# Patient Record
Sex: FEMALE | Race: WHITE | NOT HISPANIC OR LATINO | URBAN - METROPOLITAN AREA
[De-identification: names, ages, dates, MRNs, and addresses within clinical notes are randomized per-mention and may not be internally consistent; named-entity substitution may affect disease eponyms.]

---

## 2018-11-28 ENCOUNTER — EMERGENCY (EMERGENCY)
Facility: HOSPITAL | Age: 53
LOS: 0 days | Discharge: HOME | End: 2018-11-28
Attending: EMERGENCY MEDICINE
Payer: COMMERCIAL

## 2018-11-28 VITALS
DIASTOLIC BLOOD PRESSURE: 67 MMHG | RESPIRATION RATE: 18 BRPM | SYSTOLIC BLOOD PRESSURE: 132 MMHG | OXYGEN SATURATION: 97 % | TEMPERATURE: 98 F | HEART RATE: 76 BPM

## 2018-11-28 VITALS
DIASTOLIC BLOOD PRESSURE: 105 MMHG | WEIGHT: 149.91 LBS | OXYGEN SATURATION: 99 % | SYSTOLIC BLOOD PRESSURE: 197 MMHG | RESPIRATION RATE: 16 BRPM | HEART RATE: 80 BPM | HEIGHT: 66 IN | TEMPERATURE: 98 F
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
## 2018-11-28 DIAGNOSIS — R22.0 LOCALIZED SWELLING, MASS AND LUMP, HEAD: ICD-10-CM
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
## 2018-11-28 DIAGNOSIS — Z04.1 ENCOUNTER FOR EXAMINATION AND OBSERVATION FOLLOWING TRANSPORT ACCIDENT: ICD-10-CM

## 2018-11-28 DIAGNOSIS — S09.90XA UNSPECIFIED INJURY OF HEAD, INITIAL ENCOUNTER: ICD-10-CM

## 2018-11-28 PROCEDURE — 99284 EMERGENCY DEPT VISIT MOD MDM: CPT | Mod: 25

## 2018-11-28 PROCEDURE — 70450 CT HEAD/BRAIN W/O DYE: CPT | Mod: 26

## 2018-11-28 PROCEDURE — 99053 MED SERV 10PM-8AM 24 HR FAC: CPT
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
## 2018-11-28 NOTE — ED ADULT NURSE NOTE - NSIMPLEMENTINTERV_GEN_ALL_ED
Implemented All Universal Safety Interventions:  [place] to call system. Call bell, personal items and telephone within reach. Instruct patient to call for assistance. Room bathroom lighting operational. Non-slip footwear when patient is off stretcher. Physically safe environment: no spills, clutter or unnecessary equipment. Stretcher in lowest position, wheels locked, appropriate side rails in place.

## 2020-02-11 NOTE — ED ADULT NURSE NOTE - GASTROINTESTINAL ASSESSMENT
INR at goal. Medications and chart reviewed. No changes noted to necessitate adjustment of warfarin or follow-up plan. See calendar.       WDL

## 2021-03-31 ENCOUNTER — EMERGENCY (EMERGENCY)
Facility: HOSPITAL | Age: 56
LOS: 1 days | Discharge: HOME | End: 2021-03-31
Admitting: EMERGENCY MEDICINE
Payer: MEDICAID

## 2021-03-31 ENCOUNTER — OUTPATIENT (OUTPATIENT)
Dept: OUTPATIENT SERVICES | Facility: HOSPITAL | Age: 56
LOS: 1 days | End: 2021-03-31
Payer: MEDICAID

## 2021-03-31 VITALS
DIASTOLIC BLOOD PRESSURE: 120 MMHG | RESPIRATION RATE: 16 BRPM | TEMPERATURE: 97 F | WEIGHT: 190.04 LBS | HEART RATE: 50 BPM | HEIGHT: 66 IN | OXYGEN SATURATION: 99 % | SYSTOLIC BLOOD PRESSURE: 220 MMHG

## 2021-03-31 VITALS
SYSTOLIC BLOOD PRESSURE: 181 MMHG | OXYGEN SATURATION: 97 % | RESPIRATION RATE: 18 BRPM | TEMPERATURE: 97 F | HEIGHT: 66 IN | HEART RATE: 52 BPM | DIASTOLIC BLOOD PRESSURE: 117 MMHG

## 2021-03-31 DIAGNOSIS — K05.6 PERIODONTAL DISEASE, UNSPECIFIED: ICD-10-CM

## 2021-03-31 DIAGNOSIS — K08.199 COMPLETE LOSS OF TEETH DUE TO OTHER SPECIFIED CAUSE, UNSPECIFIED CLASS: Chronic | ICD-10-CM

## 2021-03-31 DIAGNOSIS — K02.63 DENTAL CARIES ON SMOOTH SURFACE PENETRATING INTO PULP: ICD-10-CM

## 2021-03-31 DIAGNOSIS — Z98.890 OTHER SPECIFIED POSTPROCEDURAL STATES: Chronic | ICD-10-CM

## 2021-03-31 DIAGNOSIS — K02.9 DENTAL CARIES, UNSPECIFIED: ICD-10-CM

## 2021-03-31 LAB
ANION GAP SERPL CALC-SCNC: 10 MMOL/L — SIGNIFICANT CHANGE UP (ref 7–14)
BUN SERPL-MCNC: 22 MG/DL — SIGNIFICANT CHANGE UP (ref 7–23)
CALCIUM SERPL-MCNC: 10.2 MG/DL — SIGNIFICANT CHANGE UP (ref 8.4–10.5)
CHLORIDE SERPL-SCNC: 105 MMOL/L — SIGNIFICANT CHANGE UP (ref 98–107)
CO2 SERPL-SCNC: 26 MMOL/L — SIGNIFICANT CHANGE UP (ref 22–31)
CREAT SERPL-MCNC: 1 MG/DL — SIGNIFICANT CHANGE UP (ref 0.5–1.3)
GLUCOSE SERPL-MCNC: 87 MG/DL — SIGNIFICANT CHANGE UP (ref 70–99)
HCG UR QL: NEGATIVE — SIGNIFICANT CHANGE UP
HCT VFR BLD CALC: 45.4 % — HIGH (ref 34.5–45)
HGB BLD-MCNC: 15.3 G/DL — SIGNIFICANT CHANGE UP (ref 11.5–15.5)
MCHC RBC-ENTMCNC: 30.5 PG — SIGNIFICANT CHANGE UP (ref 27–34)
MCHC RBC-ENTMCNC: 33.7 GM/DL — SIGNIFICANT CHANGE UP (ref 32–36)
MCV RBC AUTO: 90.4 FL — SIGNIFICANT CHANGE UP (ref 80–100)
NRBC # BLD: 0 /100 WBCS — SIGNIFICANT CHANGE UP
NRBC # FLD: 0 K/UL — SIGNIFICANT CHANGE UP
PLATELET # BLD AUTO: 200 K/UL — SIGNIFICANT CHANGE UP (ref 150–400)
POTASSIUM SERPL-MCNC: 4.1 MMOL/L — SIGNIFICANT CHANGE UP (ref 3.5–5.3)
POTASSIUM SERPL-SCNC: 4.1 MMOL/L — SIGNIFICANT CHANGE UP (ref 3.5–5.3)
RBC # BLD: 5.02 M/UL — SIGNIFICANT CHANGE UP (ref 3.8–5.2)
RBC # FLD: 12.3 % — SIGNIFICANT CHANGE UP (ref 10.3–14.5)
SODIUM SERPL-SCNC: 141 MMOL/L — SIGNIFICANT CHANGE UP (ref 135–145)
WBC # BLD: 6.39 K/UL — SIGNIFICANT CHANGE UP (ref 3.8–10.5)
WBC # FLD AUTO: 6.39 K/UL — SIGNIFICANT CHANGE UP (ref 3.8–10.5)

## 2021-03-31 PROCEDURE — 93010 ELECTROCARDIOGRAM REPORT: CPT

## 2021-03-31 PROCEDURE — 99284 EMERGENCY DEPT VISIT MOD MDM: CPT

## 2021-03-31 RX ORDER — AMLODIPINE BESYLATE 2.5 MG/1
5 TABLET ORAL ONCE
Refills: 0 | Status: COMPLETED | OUTPATIENT
Start: 2021-03-31 | End: 2021-03-31

## 2021-03-31 RX ORDER — SODIUM CHLORIDE 9 MG/ML
1000 INJECTION, SOLUTION INTRAVENOUS
Refills: 0 | Status: DISCONTINUED | OUTPATIENT
Start: 2021-05-13 | End: 2021-05-27

## 2021-03-31 RX ORDER — AMLODIPINE BESYLATE 2.5 MG/1
1 TABLET ORAL
Qty: 14 | Refills: 0
Start: 2021-03-31 | End: 2021-04-13

## 2021-03-31 RX ADMIN — AMLODIPINE BESYLATE 5 MILLIGRAM(S): 2.5 TABLET ORAL at 21:02

## 2021-03-31 NOTE — ED PROVIDER NOTE - OBJECTIVE STATEMENT
56 y/o F h/o HTN sent to ED from pcp office 2/2 elevated blood pressure. Pt was seen for presurgical clearance, however noted to have elevated blood pressure 200s/100s. Pt is completely asymptomatic. Denies any chest pain, sob, headache, dizziness. States she feels normal. Pt states she has not taken her BP meds since December as she was unable to get medication refilled. Pt has follow up with PCP tomorrow.

## 2021-03-31 NOTE — ED PROVIDER NOTE - CLINICAL SUMMARY MEDICAL DECISION MAKING FREE TEXT BOX
Asymptomatic high blood pressure. Has not been compliant with BP meds. Will give dose of her BP meds now and sent rx to pharmacy. Pt has follow up with PCP tomorrow.

## 2021-03-31 NOTE — H&P PST ADULT - NSICDXPROBLEM_GEN_ALL_CORE_FT
PROBLEM DIAGNOSES  Problem: Dental caries  Assessment and Plan: Pt scheduled for surgery and preop instructions including instructions for taking Famotidine on the day of surgery, given verbally and with use of  written materials, and patient confirming understanding of such instructions using  teach back method.  Pt sent to ER for elevated BP - request MC - forms given

## 2021-03-31 NOTE — ED PROVIDER NOTE - PATIENT PORTAL LINK FT
You can access the FollowMyHealth Patient Portal offered by [facility] by registering at the following website: [email or web address]. By joining [facility]’s FollowMyHealth portal, you will also be able to view your health information using other applications (apps) compatible with our system.

## 2021-03-31 NOTE — ED ADULT TRIAGE NOTE - CHIEF COMPLAINT QUOTE
PT presents to ED via EMS with c/o HTN. Pt was having presurgical testing completed and was found to be hypertensive 200/100. Pt has hx of HTN and has been noncompliant with Amlodipine x 3 months. Pt denies headache, numbness, tingling, vision changes, or other neuro deficits.

## 2021-03-31 NOTE — H&P PST ADULT - NSANTHOSAYNRD_GEN_A_CORE
No. DUGLAS screening performed.  STOP BANG Legend: 0-2 = LOW Risk; 3-4 = INTERMEDIATE Risk; 5-8 = HIGH Risk

## 2021-03-31 NOTE — H&P PST ADULT - HISTORY OF PRESENT ILLNESS
Pt is a 55 yr old female scheduled for Dental Implants # 22, 27, Extractions Teeth , #1, 2, 3, 4, 5, 6, 7, 8, 9, 10, 11 12, 13 14, 15, 16, 17, 19, 20, 21, 22, 23, 24, 25, 26, 27, 28, 29, 30 - pt denies pain or infection and states she has multiple cracks and cavities and desires to have teeth removed. Pt surgeon is Dr Ron and date of surgery tentatively 4/8/21. Pt off HTN med at this time - to see PCP tomorrow for MC -  Pt is a 55 yr old female scheduled for Dental Implants # 22, 27, Extractions Teeth , #1, 2, 3, 4, 5, 6, 7, 8, 9, 10, 11 12, 13 14, 15, 16, 17, 19, 20, 21, 22, 23, 24, 25, 26, 27, 28, 29, 30 - pt denies pain or infection and states she has multiple cracks and cavities and desires to have teeth removed. Pt surgeon is Dr Ron and date of surgery tentatively 4/8/21. Pt off HTN med at this time - to see PCP tomorrow for MC - Pt BP in /120 and sent to ER by ambulance 6:25pm.   Pt denies COVID or recent travel   Pt to have COVID preop test  Pt is a 55 yr old female scheduled for Dental Implants # 22, 27, Extractions Teeth , #1, 2, 3, 4, 5, 6, 7, 8, 9, 10, 11 12, 13 14, 15, 16, 17, 19, 20, 21, 22, 23, 24, 25, 26, 27, 28, 29, 30 - pt denies pain or infection and states she has multiple cracks and cavities and desires to have teeth removed. Pt surgeon is Dr Ron and date of surgery tentatively 4/8/21. Pt off HTN med at this time - to see PCP tomorrow for MC - Pt BP in /120 and sent to ER by ambulance 6:25pm. -Pt denies HA, CP or change in vision   Pt denies COVID or recent travel   Pt to have COVID preop test

## 2021-03-31 NOTE — H&P PST ADULT - CARDIOVASCULAR COMMENTS
Hx of HTN - pt stopped med 3 months ago - has not had Rx renewed - was to see PCP in am - BP in /120 - pt denies CP or HA or vision changes

## 2021-03-31 NOTE — ED ADULT NURSE NOTE - OBJECTIVE STATEMENT
54yo female received in cdu 1. pt A&Ox3, ambulatory, hx of htn, was sent from her PCP for elevated blood pressure. pt states she haven't took any blood pressure medication since last year december. pt asymptomatic, denies headache or blurriness in vision. CRISSY Marte assessing patient at bedside. respiration even and non-labored. in nad. med administered as per order. MD kim in progress.

## 2021-04-02 DIAGNOSIS — Z01.818 ENCOUNTER FOR OTHER PREPROCEDURAL EXAMINATION: ICD-10-CM

## 2021-04-02 PROBLEM — Z00.00 ENCOUNTER FOR PREVENTIVE HEALTH EXAMINATION: Status: ACTIVE | Noted: 2021-04-02

## 2021-04-05 ENCOUNTER — APPOINTMENT (OUTPATIENT)
Dept: DISASTER EMERGENCY | Facility: CLINIC | Age: 56
End: 2021-04-05

## 2021-04-05 LAB — SARS-COV-2 N GENE NPH QL NAA+PROBE: NOT DETECTED

## 2021-04-27 PROBLEM — K02.9 DENTAL CARIES, UNSPECIFIED: Chronic | Status: ACTIVE | Noted: 2021-03-31

## 2021-04-27 PROBLEM — I10 ESSENTIAL (PRIMARY) HYPERTENSION: Chronic | Status: ACTIVE | Noted: 2021-03-31

## 2021-05-03 ENCOUNTER — APPOINTMENT (OUTPATIENT)
Dept: MRI IMAGING | Facility: HOSPITAL | Age: 56
End: 2021-05-03

## 2021-05-03 ENCOUNTER — OUTPATIENT (OUTPATIENT)
Dept: OUTPATIENT SERVICES | Facility: HOSPITAL | Age: 56
LOS: 1 days | End: 2021-05-03
Payer: COMMERCIAL

## 2021-05-03 DIAGNOSIS — Z98.890 OTHER SPECIFIED POSTPROCEDURAL STATES: Chronic | ICD-10-CM

## 2021-05-03 DIAGNOSIS — K08.199 COMPLETE LOSS OF TEETH DUE TO OTHER SPECIFIED CAUSE, UNSPECIFIED CLASS: Chronic | ICD-10-CM

## 2021-05-03 PROCEDURE — A9577: CPT

## 2021-05-03 PROCEDURE — 75561 CARDIAC MRI FOR MORPH W/DYE: CPT | Mod: 26

## 2021-05-03 PROCEDURE — 75561 CARDIAC MRI FOR MORPH W/DYE: CPT

## 2021-05-07 ENCOUNTER — NON-APPOINTMENT (OUTPATIENT)
Age: 56
End: 2021-05-07

## 2021-05-07 DIAGNOSIS — Z86.79 PERSONAL HISTORY OF OTHER DISEASES OF THE CIRCULATORY SYSTEM: ICD-10-CM

## 2021-05-07 DIAGNOSIS — F17.210 NICOTINE DEPENDENCE, CIGARETTES, UNCOMPLICATED: ICD-10-CM

## 2021-05-07 DIAGNOSIS — Z86.39 PERSONAL HISTORY OF OTHER ENDOCRINE, NUTRITIONAL AND METABOLIC DISEASE: ICD-10-CM

## 2021-05-07 RX ORDER — LOSARTAN POTASSIUM AND HYDROCHLOROTHIAZIDE 12.5; 1 MG/1; MG/1
100-12.5 TABLET ORAL DAILY
Refills: 0 | Status: ACTIVE | COMMUNITY

## 2021-05-07 RX ORDER — ATORVASTATIN CALCIUM 10 MG/1
10 TABLET, FILM COATED ORAL DAILY
Refills: 0 | Status: ACTIVE | COMMUNITY

## 2021-05-07 RX ORDER — VARENICLINE TARTRATE 1 MG/1
TABLET, FILM COATED ORAL
Refills: 0 | Status: ACTIVE | COMMUNITY

## 2021-05-10 ENCOUNTER — APPOINTMENT (OUTPATIENT)
Dept: CARDIOTHORACIC SURGERY | Facility: CLINIC | Age: 56
End: 2021-05-10
Payer: MEDICAID

## 2021-05-10 ENCOUNTER — OUTPATIENT (OUTPATIENT)
Dept: OUTPATIENT SERVICES | Facility: HOSPITAL | Age: 56
LOS: 1 days | End: 2021-05-10
Payer: COMMERCIAL

## 2021-05-10 VITALS
OXYGEN SATURATION: 98 % | BODY MASS INDEX: 30.53 KG/M2 | RESPIRATION RATE: 16 BRPM | DIASTOLIC BLOOD PRESSURE: 80 MMHG | SYSTOLIC BLOOD PRESSURE: 135 MMHG | HEIGHT: 66 IN | HEART RATE: 60 BPM | WEIGHT: 190 LBS

## 2021-05-10 DIAGNOSIS — Z82.49 FAMILY HISTORY OF ISCHEMIC HEART DISEASE AND OTHER DISEASES OF THE CIRCULATORY SYSTEM: ICD-10-CM

## 2021-05-10 DIAGNOSIS — Z83.3 FAMILY HISTORY OF DIABETES MELLITUS: ICD-10-CM

## 2021-05-10 DIAGNOSIS — Z98.890 OTHER SPECIFIED POSTPROCEDURAL STATES: Chronic | ICD-10-CM

## 2021-05-10 DIAGNOSIS — K08.199 COMPLETE LOSS OF TEETH DUE TO OTHER SPECIFIED CAUSE, UNSPECIFIED CLASS: Chronic | ICD-10-CM

## 2021-05-10 DIAGNOSIS — D15.1 BENIGN NEOPLASM OF HEART: ICD-10-CM

## 2021-05-10 DIAGNOSIS — Z78.9 OTHER SPECIFIED HEALTH STATUS: ICD-10-CM

## 2021-05-10 DIAGNOSIS — I51.89 OTHER ILL-DEFINED HEART DISEASES: ICD-10-CM

## 2021-05-10 PROCEDURE — 99204 OFFICE O/P NEW MOD 45 MIN: CPT

## 2021-05-10 PROCEDURE — 99072 ADDL SUPL MATRL&STAF TM PHE: CPT

## 2021-05-10 PROCEDURE — 71046 X-RAY EXAM CHEST 2 VIEWS: CPT

## 2021-05-10 PROCEDURE — 71046 X-RAY EXAM CHEST 2 VIEWS: CPT | Mod: 26

## 2021-05-11 ENCOUNTER — LABORATORY RESULT (OUTPATIENT)
Age: 56
End: 2021-05-11

## 2021-05-11 ENCOUNTER — APPOINTMENT (OUTPATIENT)
Dept: DISASTER EMERGENCY | Facility: CLINIC | Age: 56
End: 2021-05-11

## 2021-05-11 PROBLEM — D15.1 PAPILLARY FIBROELASTOMA OF HEART: Status: ACTIVE | Noted: 2021-05-11

## 2021-05-11 PROBLEM — Z78.9 DOES NOT USE ILLICIT DRUGS: Status: ACTIVE | Noted: 2021-05-11

## 2021-05-11 PROBLEM — I51.89 MASS OF LEFT CARDIAC VENTRICLE: Status: ACTIVE | Noted: 2021-05-07

## 2021-05-11 PROBLEM — Z82.49 FAMILY HISTORY OF HYPERTENSION: Status: ACTIVE | Noted: 2021-05-11

## 2021-05-11 PROBLEM — Z78.9 SOCIAL ALCOHOL USE: Status: ACTIVE | Noted: 2021-05-11

## 2021-05-11 PROBLEM — Z83.3 FAMILY HISTORY OF DIABETES MELLITUS: Status: ACTIVE | Noted: 2021-05-11

## 2021-05-12 ENCOUNTER — OUTPATIENT (OUTPATIENT)
Dept: OUTPATIENT SERVICES | Facility: HOSPITAL | Age: 56
LOS: 1 days | End: 2021-05-12

## 2021-05-12 VITALS
HEART RATE: 54 BPM | WEIGHT: 188.94 LBS | OXYGEN SATURATION: 99 % | TEMPERATURE: 97 F | RESPIRATION RATE: 16 BRPM | DIASTOLIC BLOOD PRESSURE: 80 MMHG | HEIGHT: 67.5 IN | SYSTOLIC BLOOD PRESSURE: 100 MMHG

## 2021-05-12 DIAGNOSIS — K02.9 DENTAL CARIES, UNSPECIFIED: ICD-10-CM

## 2021-05-12 DIAGNOSIS — K02.63 DENTAL CARIES ON SMOOTH SURFACE PENETRATING INTO PULP: ICD-10-CM

## 2021-05-12 DIAGNOSIS — I10 ESSENTIAL (PRIMARY) HYPERTENSION: ICD-10-CM

## 2021-05-12 DIAGNOSIS — D15.1 BENIGN NEOPLASM OF HEART: ICD-10-CM

## 2021-05-12 DIAGNOSIS — Z86.79 PERSONAL HISTORY OF OTHER DISEASES OF THE CIRCULATORY SYSTEM: ICD-10-CM

## 2021-05-12 DIAGNOSIS — Z98.890 OTHER SPECIFIED POSTPROCEDURAL STATES: Chronic | ICD-10-CM

## 2021-05-12 DIAGNOSIS — K05.6 PERIODONTAL DISEASE, UNSPECIFIED: ICD-10-CM

## 2021-05-12 DIAGNOSIS — K08.199 COMPLETE LOSS OF TEETH DUE TO OTHER SPECIFIED CAUSE, UNSPECIFIED CLASS: Chronic | ICD-10-CM

## 2021-05-12 LAB
HCG UR QL: NEGATIVE — SIGNIFICANT CHANGE UP
HCT VFR BLD CALC: 43.3 % — SIGNIFICANT CHANGE UP (ref 34.5–45)
HGB BLD-MCNC: 15.2 G/DL — SIGNIFICANT CHANGE UP (ref 11.5–15.5)
MCHC RBC-ENTMCNC: 31.3 PG — SIGNIFICANT CHANGE UP (ref 27–34)
MCHC RBC-ENTMCNC: 35.1 GM/DL — SIGNIFICANT CHANGE UP (ref 32–36)
MCV RBC AUTO: 89.1 FL — SIGNIFICANT CHANGE UP (ref 80–100)
NRBC # BLD: 0 /100 WBCS — SIGNIFICANT CHANGE UP
NRBC # FLD: 0 K/UL — SIGNIFICANT CHANGE UP
PLATELET # BLD AUTO: 209 K/UL — SIGNIFICANT CHANGE UP (ref 150–400)
RBC # BLD: 4.86 M/UL — SIGNIFICANT CHANGE UP (ref 3.8–5.2)
RBC # FLD: 11.9 % — SIGNIFICANT CHANGE UP (ref 10.3–14.5)
WBC # BLD: 5.15 K/UL — SIGNIFICANT CHANGE UP (ref 3.8–10.5)
WBC # FLD AUTO: 5.15 K/UL — SIGNIFICANT CHANGE UP (ref 3.8–10.5)

## 2021-05-12 RX ORDER — SODIUM CHLORIDE 9 MG/ML
3 INJECTION INTRAMUSCULAR; INTRAVENOUS; SUBCUTANEOUS EVERY 8 HOURS
Refills: 0 | Status: DISCONTINUED | OUTPATIENT
Start: 2021-05-13 | End: 2021-05-27

## 2021-05-12 RX ORDER — SODIUM CHLORIDE 9 MG/ML
1000 INJECTION, SOLUTION INTRAVENOUS
Refills: 0 | Status: DISCONTINUED | OUTPATIENT
Start: 2021-05-13 | End: 2021-05-27

## 2021-05-12 NOTE — H&P PST ADULT - CARDIOVASCULAR COMMENTS
h/o HTN- pt's BP at last PST visit in March was 220/120- pt transferred to [facility] ER for cardiac evaluation. see HPI

## 2021-05-12 NOTE — H&P PST ADULT - NSICDXPROBLEM_GEN_ALL_CORE_FT
PROBLEM DIAGNOSES  Problem: Dental caries  Assessment and Plan: preop for dental implants teeth and multiple extractions on 5/13/21  preop instructions given, pt verbalized understanding  GI prophylaxis provided  pt got COVID testing done preop    Problem: HTN (hypertension)  Assessment and Plan: pt will take blood pressure meds Losartan/ HCTZ and amlodipine  AM of surgery as prescribed      Problem: H/O benign neoplasm of heart  Assessment and Plan: CT surgery clearance and ECHO on chart        PROBLEM DIAGNOSES  Problem: Dental caries  Assessment and Plan: preop for dental implants teeth and multiple extractions on 5/13/21  preop instructions given, pt verbalized understanding  GI prophylaxis provided  STAT labs pending  pt got COVID testing done preop    Problem: HTN (hypertension)  Assessment and Plan: pt will take blood pressure meds Losartan/ HCTZ and amlodipine  AM of surgery as prescribed      Problem: H/O benign neoplasm of heart  Assessment and Plan: CT surgery clearance and recent ECHO report on chart

## 2021-05-12 NOTE — HISTORY OF PRESENT ILLNESS
[FreeTextEntry1] : 55 year old female, current smoker (over 35 years), with a past medical history of multiple dental extractions, hypertension, hyperlipidemia, LVH, bradycardia, presents for evaluation and management of fibroelastoma. \par \par Patient was undergoing preop clearance for dental extractions (with Dr. Dmitry Ron at [facility]). She was found to have an abnormal EKG and heart murmur and was referred to cardiology for additional workups. \par \par Echocardiogram 4-21-21: \par LV cavity size is normal. Increased relative wall thickness with normal LV mass, consistent with moderate concentric remodeling. Normal left ventricular systolic function. LV EF 65%. mild  mitral valve regurgitation. \par The aortic valve has a trileaflet configuration. Mild aortic valve stenosis. Mild to moderate aortic regurgitation. \par 1x1.3 cm smooth pedunculated, mobile mass attached to prox septum in LVOT.\par \par Patient is doing well and denies recent hospitalization, ER visits, or surgeries. She  denies fever, chills, fatigue, headache, blurred vision, dizziness, syncope, chest pain, palpitations, shortness of breath, orthopnea, paroxysmal nocturnal dyspnea, nausea, vomiting, abdominal pain, back pain, BRBPR or swelling to legs.\par

## 2021-05-12 NOTE — ASU PATIENT PROFILE, ADULT - PMH
Aortic insufficiency    Dental caries    Heart murmur    HLD (hyperlipidemia)    HTN (hypertension)    Mild aortic valve stenosis    Papillary fibroelastoma of heart    Sinus bradycardia

## 2021-05-12 NOTE — H&P PST ADULT - RS GEN PE MLT RESP DETAILS PC
airway patent/clear to auscultation bilaterally airway patent/respirations non-labored/clear to auscultation bilaterally/no chest wall tenderness/no wheezes

## 2021-05-12 NOTE — END OF VISIT
[FreeTextEntry3] : \par I, MARJAN NARAYANANU , am scribing for and in the presence of GEORGE MIKE the following sections: History of present illness, past Medical/family/surgical/family/social history, review of systems, vital signs, physical exam and disposition.\par \par I personally performed the services described in the documentation, reviewed the documentation recorded by the scribe in my presence and it accurately and completely records my words and actions.\par \par

## 2021-05-12 NOTE — H&P PST ADULT - NSICDXPASTMEDICALHX_GEN_ALL_CORE_FT
PAST MEDICAL HISTORY:  Aortic insufficiency     Dental caries     Heart murmur     HTN (hypertension)     Mild aortic valve stenosis     Papillary fibroelastoma of heart     Sinus bradycardia      PAST MEDICAL HISTORY:  Aortic insufficiency     Dental caries     Heart murmur     HLD (hyperlipidemia)     HTN (hypertension)     Mild aortic valve stenosis     Papillary fibroelastoma of heart     Sinus bradycardia

## 2021-05-12 NOTE — DATA REVIEWED
[FreeTextEntry1] : MR Card Morphology Function 5/3/21:\par 1. The left ventricle (LV) is normal in size. There is severe concentric LVH with largest diameter basal septum approximately 22mm. Left ventricular global systolic function is hyperdynamic with obliteration of LV cavity. The LV ejection fraction is 80 %.\par 2. There is a mobile mass measuring 4x4mm in the LVOT characteristics consistent with fibroelastoma\par 3. The right ventricle (RV) is normal in size. RV global systolic function is normal. The RV ejection fraction is 68 %.\par 4. Mild PI/AI.\par 5. No significant abnormalities of the visualized portions of the great vessels.\par 6. On delayed enhancement imaging, there is no evidence of myocardial scarring.\par \par

## 2021-05-12 NOTE — CONSULT LETTER
[FreeTextEntry2] : John Grace MD\par [address]\[place] [address] \par [phone]\par F: [phone] [FreeTextEntry1] : Please find attached our consultation on your patient, KD AMTTHEW \par \par We take a multidisciplinary team approach to patient care and consider you, the referring physician, an extension of our team. We will maintain an open line of communication with you throughout your patient's treatment course. \par \par It is our commitment to provide your patient with the highest quality of advanced therapeutic options. We thank you for allowing us to participate in the care of your patient.\par \par Please do not hesitate to contact our team with any questions or concerns at [phone].\par   [FreeTextEntry3] : \par \par \par Mukund Boss M.D.\par Professor of Cardiovascular and Thoracic Surgery\par Minimally Invasive Valve Surgeon\par Director of Aortic Surgery, [facility]\par Cell: [phone]\par Email: [email or web address] \par \par [facility]:\par [address] Floor, [place] [address]\par Office: [phone]\par Fax: [phone]\par \par [facility]:\par Department of Cardiovascular and Thoracic Surgery\par [address] [place] [address], [address]\par Office: [phone]\par Fax: [phone]\par \par \par Practice Manager: Ms. Blanca Starr\par Email: [email or web address] \par Phone: [phone]

## 2021-05-12 NOTE — PROCEDURE
[FreeTextEntry1] : \par Patient was advised to view the educational video prior to this visit regarding aortic pathology, risk factors, surgical procedures, and lifestyle modifications. Video can be retrieved at <[email or web address].\par

## 2021-05-12 NOTE — H&P PST ADULT - LYMPHATICS COMMENTS
No cervical supraclavicular lymphadenopathy palpated No cervical/supraclavicular lymphadenopathy palpated

## 2021-05-12 NOTE — ASSESSMENT
[FreeTextEntry1] : 55 year old female, current smoker (over 35 years), with a past medical history of multiple dental extractions, hypertension, hyperlipidemia, LVH, bradycardia, presents for evaluation and management of fibroelastoma.\par \par \par \par I have reviewed the patient's medical records, diagnostic images during the time of this office consultation and have made the following recommendation. Review of the imaging shows a fibroelastoma of aortic valve. \par \par I had a lengthy discussion with the patient regarding her valvular disease and progression. I have recommended that the patient is a candidate for a resection of fibroelastoma, possible aortic valve replacement. I have expressed concerns that patient will have a devastating embolic event from dislodgement of fibroelastoma. \par \par The patient was educated on various valve options.  I have described the mechanical valve prosthesis which does require Coumadin therapy with a daily pill as well as frequent lab tests. The mechanical valve has excellent longevity and is usually only removed in the cases of infective bacterial prosthetic valve endocarditis or pannus formation. Approximately over 90% of mechanical valves never need to be removed. However, there is a risk with Coumadin, a blood thinner, approximately a 1% thromboembolic risk per year. The On-x mechanical valve was also discussed, which requires a lower Coumadin dosage and INR therapeutic range. \par \par The other valve option is a biological valve. In general, approximately 50% of these need to be removed at approximately 15 years. However, these valves do not require Coumadin therapy and, therefore, do not have the associated risks of the blood thinner. I discussed that with the current use of Transcatheter Aortic Valve Replacement (TAVR), there is a possibility that future replacement of a failing bioprosthetic valve by TAVR may be an option. The Inspira and MagnaEase valves and their morphology fitted for future TAVRs was discussed as well. \par \par The patient has chosen a bioprosthesis. \par \par I have discussed the risks, benefits and alternatives to surgery. I have explained the risks of the surgery, including approximately 1% major mortality or morbidity including stroke, infection, bleeding, death, renal failure and heart attack. \par \par -The patient is a candidate for a resection of fibroelastoma, possible aortic valve replacement. \par -The patient is under the care of Dr. Dmitry Ron from [facility]. Patient has been recommended to undergo teeth extractions prior to the cardiac surgery. Discussed the plan with Dr. Ron and will have pt schedule for dental procedure in the next few days. We will then schedule the cardiac surgery one week from dental extractions to allow her time to recover. \par - Continue current medication regimen.\par - Blood pressure management.\par - Follow up with Dr. Grace. \par - Patient will require a covid test 72 hrs prior to admission. She will also need to admitted for cardiac cath and PST (labs, EKG, carotid doppler, chest xray, PFT).\par - Smoking cessation.

## 2021-05-12 NOTE — PHYSICAL EXAM
[General Appearance - Alert] : alert [General Appearance - In No Acute Distress] : in no acute distress [Sclera] : the sclera and conjunctiva were normal [Outer Ear] : the ears and nose were normal in appearance [Neck Appearance] : the appearance of the neck was normal [Respiration, Rhythm And Depth] : normal respiratory rhythm and effort [Heart Rate And Rhythm] : heart rate was normal and rhythm regular [Examination Of The Chest] : the chest was normal in appearance [2+] : left 2+ [No Abnormalities] : the abdominal aorta was not enlarged and no bruit was heard [Bowel Sounds] : normal bowel sounds [No CVA Tenderness] : no ~M costovertebral angle tenderness [Abnormal Walk] : normal gait [Skin Color & Pigmentation] : normal skin color and pigmentation [No Focal Deficits] : no focal deficits [Oriented To Time, Place, And Person] : oriented to person, place, and time [FreeTextEntry1] : deferred

## 2021-05-12 NOTE — H&P PST ADULT - HISTORY OF PRESENT ILLNESS
55 yr old female with HTN, HLD, LVH, aortic insufficiency and sinus bradycardia presents to [facility] preop for Dental Implants # 22, 27, Extractions Teeth , #1, 2, 3, 4, 5, 6, 7, 8, 9, 10, 11 12, 13 14, 15, 16, 17, 19, 20, 21, 22, 23, 24, 25, 26, 27, 28, 29, 30. pt was recently found to have an abnormal EKG and heart murmur and was referred to cardiology for further evaluation. subsequently diagnosed with fibroelastoma of aortic valve. she is currently under the care of CT surgery and will undergo resection of fibroelastoma, possible aortic valve replacement.           55 yr old female with HTN, HLD, LVH, aortic insufficiency and sinus bradycardia presents to [facility] preop for Dental Implants # 22, 27, Extractions Teeth , #1, 2, 3, 4, 5, 6, 7, 8, 9, 10, 11 12, 13 14, 15, 16, 17, 19, 20, 21, 22, 23, 24, 25, 26, 27, 28, 29, 30. pt was recently found to have an abnormal EKG and heart murmur and was referred to cardiology for further evaluation. subsequently diagnosed with fibroelastoma of aortic valve. she is currently under the care of CT surgery and will undergo resection of fibroelastoma, possible aortic valve replacement in the near future.

## 2021-05-13 ENCOUNTER — OUTPATIENT (OUTPATIENT)
Dept: OUTPATIENT SERVICES | Facility: HOSPITAL | Age: 56
LOS: 1 days | Discharge: HOME | End: 2021-05-13

## 2021-05-13 VITALS
HEART RATE: 57 BPM | OXYGEN SATURATION: 97 % | DIASTOLIC BLOOD PRESSURE: 71 MMHG | TEMPERATURE: 98 F | SYSTOLIC BLOOD PRESSURE: 117 MMHG | WEIGHT: 188.94 LBS | RESPIRATION RATE: 16 BRPM | HEIGHT: 67.5 IN

## 2021-05-13 VITALS
OXYGEN SATURATION: 97 % | DIASTOLIC BLOOD PRESSURE: 75 MMHG | TEMPERATURE: 98 F | SYSTOLIC BLOOD PRESSURE: 166 MMHG | HEART RATE: 85 BPM | RESPIRATION RATE: 16 BRPM

## 2021-05-13 DIAGNOSIS — K05.6 PERIODONTAL DISEASE, UNSPECIFIED: ICD-10-CM

## 2021-05-13 DIAGNOSIS — K08.199 COMPLETE LOSS OF TEETH DUE TO OTHER SPECIFIED CAUSE, UNSPECIFIED CLASS: Chronic | ICD-10-CM

## 2021-05-13 DIAGNOSIS — Z98.890 OTHER SPECIFIED POSTPROCEDURAL STATES: Chronic | ICD-10-CM

## 2021-05-13 LAB — HCG UR QL: NEGATIVE — SIGNIFICANT CHANGE UP

## 2021-05-13 RX ORDER — ACETAMINOPHEN 500 MG
1000 TABLET ORAL ONCE
Refills: 0 | Status: COMPLETED | OUTPATIENT
Start: 2021-05-13 | End: 2021-05-13

## 2021-05-13 RX ORDER — OXYCODONE HYDROCHLORIDE 5 MG/1
1 TABLET ORAL
Qty: 16 | Refills: 0
Start: 2021-05-13 | End: 2021-05-16

## 2021-05-13 RX ORDER — ACETAMINOPHEN 500 MG
20.3 TABLET ORAL
Qty: 568.4 | Refills: 0
Start: 2021-05-13 | End: 2021-05-19

## 2021-05-13 RX ORDER — ONDANSETRON 8 MG/1
4 TABLET, FILM COATED ORAL ONCE
Refills: 0 | Status: DISCONTINUED | OUTPATIENT
Start: 2021-05-13 | End: 2021-05-27

## 2021-05-13 RX ORDER — FENTANYL CITRATE 50 UG/ML
25 INJECTION INTRAVENOUS
Refills: 0 | Status: DISCONTINUED | OUTPATIENT
Start: 2021-05-13 | End: 2021-05-13

## 2021-05-13 RX ORDER — LOSARTAN/HYDROCHLOROTHIAZIDE 100MG-25MG
1 TABLET ORAL
Qty: 0 | Refills: 0 | DISCHARGE

## 2021-05-13 RX ORDER — AMLODIPINE BESYLATE 2.5 MG/1
1 TABLET ORAL
Qty: 0 | Refills: 0 | DISCHARGE

## 2021-05-13 RX ORDER — OXYCODONE HYDROCHLORIDE 5 MG/1
5 TABLET ORAL ONCE
Refills: 0 | Status: DISCONTINUED | OUTPATIENT
Start: 2021-05-13 | End: 2021-05-13

## 2021-05-13 RX ORDER — HYDRALAZINE HCL 50 MG
5 TABLET ORAL
Refills: 0 | Status: COMPLETED | OUTPATIENT
Start: 2021-05-13 | End: 2021-05-13

## 2021-05-13 RX ORDER — METOCLOPRAMIDE HCL 10 MG
10 TABLET ORAL ONCE
Refills: 0 | Status: DISCONTINUED | OUTPATIENT
Start: 2021-05-13 | End: 2021-05-27

## 2021-05-13 RX ORDER — ATORVASTATIN CALCIUM 80 MG/1
1 TABLET, FILM COATED ORAL
Qty: 0 | Refills: 0 | DISCHARGE

## 2021-05-13 RX ORDER — ASPIRIN/CALCIUM CARB/MAGNESIUM 324 MG
1 TABLET ORAL
Qty: 0 | Refills: 0 | DISCHARGE

## 2021-05-13 RX ORDER — OXYCODONE HYDROCHLORIDE 5 MG/1
5 TABLET ORAL
Qty: 80 | Refills: 0
Start: 2021-05-13 | End: 2021-05-16

## 2021-05-13 RX ORDER — FENTANYL CITRATE 50 UG/ML
50 INJECTION INTRAVENOUS
Refills: 0 | Status: DISCONTINUED | OUTPATIENT
Start: 2021-05-13 | End: 2021-05-13

## 2021-05-13 RX ORDER — IBUPROFEN 200 MG
1 TABLET ORAL
Qty: 28 | Refills: 0
Start: 2021-05-13 | End: 2021-05-19

## 2021-05-13 RX ORDER — AMOXICILLIN 250 MG/5ML
1 SUSPENSION, RECONSTITUTED, ORAL (ML) ORAL
Qty: 14 | Refills: 0
Start: 2021-05-13 | End: 2021-05-19

## 2021-05-13 RX ORDER — CHLORHEXIDINE GLUCONATE 213 G/1000ML
15 SOLUTION TOPICAL
Qty: 210 | Refills: 0
Start: 2021-05-13 | End: 2021-05-19

## 2021-05-13 RX ADMIN — Medication 5 MILLIGRAM(S): at 15:51

## 2021-05-13 RX ADMIN — FENTANYL CITRATE 50 MICROGRAM(S): 50 INJECTION INTRAVENOUS at 14:52

## 2021-05-13 RX ADMIN — Medication 400 MILLIGRAM(S): at 17:52

## 2021-05-13 RX ADMIN — FENTANYL CITRATE 50 MICROGRAM(S): 50 INJECTION INTRAVENOUS at 15:03

## 2021-05-13 RX ADMIN — OXYCODONE HYDROCHLORIDE 5 MILLIGRAM(S): 5 TABLET ORAL at 12:30

## 2021-05-13 RX ADMIN — SODIUM CHLORIDE 30 MILLILITER(S): 9 INJECTION, SOLUTION INTRAVENOUS at 17:52

## 2021-05-13 RX ADMIN — FENTANYL CITRATE 50 MICROGRAM(S): 50 INJECTION INTRAVENOUS at 13:53

## 2021-05-13 RX ADMIN — Medication 5 MILLIGRAM(S): at 15:04

## 2021-05-13 RX ADMIN — OXYCODONE HYDROCHLORIDE 5 MILLIGRAM(S): 5 TABLET ORAL at 12:00

## 2021-05-13 RX ADMIN — Medication 5 MILLIGRAM(S): at 16:29

## 2021-05-13 NOTE — ASU DISCHARGE PLAN (ADULT/PEDIATRIC) - FOLLOW UP APPOINTMENTS
[facility] may also call Recovery Room (PACU) 24/7 @ [phone]/[facility]

## 2021-05-13 NOTE — H&P ADULT - NSHPPHYSICALEXAM_GEN_ALL_CORE
Physical Exam:  · Constitutional	well-groomed; no distress  · Eyes	PERRL; EOMI; conjunctiva clear  · ENMT	mouth = normal mouth and gums; moist  · Neck		supple; no JVD; full ROM of neck  · Breasts		normal shape  · Back		normal shape; ROM intact; strength intact  · Respiratory		airway patent; respirations non-labored; clear to auscultation bilaterally; no chest wall tenderness; no wheezes  · Cardiovascular		regular rate and rhythm  bradycardia; positive S1; positive S2; murmur  · Murmur Timing	systolic  · Character of Systolic Murmur	murmur loudness: II/VI  · Gastrointestinal		soft; bowel sounds normal  · Genitourinary	not examined  · Rectal	not examined  · Extremities		no pedal edema  · Vascular	detailed exam  · Radial Pulse	right normal; left normal  · Neurological	Alert & oriented; no sensory, motor or coordination deficits, normal reflexes  · Skin		warm and dry  · Lymph Nodes		No cervical/supraclavicular lymphadenopathy palpated  · Musculoskeletal	No joint pain, swelling or deformity; no limitation of movement  · Psychiatric	Affect and characteristics of appearance, verbalizations, behaviors are appropriate

## 2021-05-13 NOTE — H&P ADULT - REASON FOR ADMISSION
extractions and implants Pre-Excision Curettage Text (Leave Blank If You Do Not Want): Prior to drawing the surgical margin the visible lesion was removed with electrodesiccation and curettage to clearly define the lesion size.

## 2021-05-13 NOTE — H&P ADULT - NSHPSOCIALHISTORY_GEN_ALL_CORE
FAMILY HISTORY:  Mother  Still living? Unknown  FH: hypertension, Age at diagnosis: Age Unknown.     Social History:  · Marital Status	  · Occupation	  · Lives With	alone     Substance Use History:  · Substance Use	caffeine  · Caffeine Type	coffee  · Caffeine Amount/Frequency	1-2 cups/cans per day     Alcohol Use History:  · Have you ever consumed alcohol	yes...  · Alcohol Type	wine  · Alcohol Frequency	2-4 times/mo  · Alcohol Amount	1-2 drinks  · Problems Related to Alcohol Use	no  · 1. Have you felt you ought to CUT down on your drinking?	no  · 2. Have people ANNOYED you by criticizing your drinking?	no  · 3. Have you ever felt bad or GUILTY about your drinking?	no  · 4. Have you ever needed an "EYE OPENER", a drink first thing in the morning to steady your nerves or get rid of a hangover?	no     Tobacco Usage:  · Tobacco Usage: Current every day smoker  · Tobacco Type: cigarettes  · Number of Packs per Day: 1  · Number of yrs: 30  · Pack yrs: 30  · Readiness to Quit Tobacco Use: ready to quit  · Attempts to Quit Tobacco Use: started on Chantix 15 days ago       Passive Smoke Exposure:  · Passive Smoke Exposure	No

## 2021-05-13 NOTE — ASU DISCHARGE PLAN (ADULT/PEDIATRIC) - CALL YOUR DOCTOR IF YOU HAVE ANY OF THE FOLLOWING:
Bleeding that does not stop/Swelling that gets worse/Fever greater than (need to indicate Fahrenheit or Celsius)/Numbness, tingling, color or temperature change to extremity

## 2021-05-13 NOTE — H&P ADULT - HISTORY OF PRESENT ILLNESS
55 yr old female with HTN, HLD, LVH, aortic insufficiency and sinus bradycardia presents to [facility] OR under GA for Dental Implants # 22, 27, Extractions Teeth , #1, 2, 3, 4, 5, 6, 7, 8, 9, 10, 11 12, 13 14, 15, 16, 17, 19, 20, 21, 22, 23, 24, 25, 26, 27, 28, 29, 30 with Dr. Ron on 5/12/2021. pt was recently found to have an abnormal EKG and heart murmur and was referred to cardiology for further evaluation. subsequently diagnosed with fibroelastoma of aortic valve. she is currently under the care of CT surgery and will undergo resection of fibroelastoma, possible aortic valve replacement in the near future

## 2021-05-13 NOTE — H&P ADULT - NSICDXPASTMEDICALHX_GEN_ALL_CORE_FT
PAST MEDICAL HISTORY:  Aortic insufficiency     Dental caries     Heart murmur     HLD (hyperlipidemia)     HTN (hypertension)     Mild aortic valve stenosis     Papillary fibroelastoma of heart     Sinus bradycardia

## 2021-05-13 NOTE — H&P ADULT - NSHPREVIEWOFSYSTEMS_GEN_ALL_CORE
Review of Systems:   · Negative General Symptoms	no fever; no chills  · General Symptoms	sweating; hot flashes  · Negative Skin Symptoms	no rash; no itching; no dryness  · Negative Breast Symptoms	no breast tenderness L; no breast tenderness R; no breast lump L; no breast lump R; no nipple discharge L; no nipple discharge R  · Ophthalmologic	negative  · ENMT	negative  · Negative Respiratory and Thorax Symptoms	no wheezing; no dyspnea; no cough; no hemoptysis  · Negative Cardiovascular Symptoms	no chest pain; no palpitations; no dyspnea on exertion; no peripheral edema  · Cardiovascular Comments	h/o HTN- pt's BP at last PST visit in March was 220/120- pt transferred to [facility] ER for cardiac evaluation. see HPI  · Negative Gastrointestinal Symptoms	no nausea; no vomiting; no diarrhea; no abdominal pain  · Negative General Genitourinary Symptoms	no hematuria; no flank pain L; no flank pain R; no dysuria  · Negative Female-Specific Symptoms	no abnormal vaginal bleeding; no pelvic pain  · Musculoskeletal	negative  · Neurological	negative  · Psychiatric	negative  · Hematology/Lymphatics	negative  · Endocrine	negative  · Allergic/Immunologic	negative

## 2021-05-13 NOTE — PROVIDER CONTACT NOTE (OTHER) - ASSESSMENT
Pt A&Ox4, denies chest pain, denies chest palpitations, denies SOB. VS stable otherwise. Tolerating PO.
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
## 2021-05-13 NOTE — ASU DISCHARGE PLAN (ADULT/PEDIATRIC) - NURSING INSTRUCTIONS
you received iv tylenol in the operating room at 9am, no acetaminophen products or tylenol until 3pm

## 2021-05-13 NOTE — ASU DISCHARGE PLAN (ADULT/PEDIATRIC) - CARE PROVIDER_API CALL
Dmitry Ron (BITA; MD)  OralMaxillofacial Surgery  [address], [address]  [place] [address]  Phone: [phone]  Fax: [phone]  Follow Up Time:     Ritesh Campos (BITA; MD)  OralMaxillofacial Surgery  [address] [place] [address]  Phone: [phone]  Fax: [phone]  Follow Up Time:

## 2021-05-13 NOTE — H&P ADULT - ASSESSMENT
55 yr old female with HTN, HLD, LVH, aortic insufficiency and sinus bradycardia presents to [facility] OR under [facility] for Dental Implants # 22, 27, Extractions Teeth , #1, 2, 3, 4, 5, 6, 7, 8, 9, 10, 11 12, 13 14, 15, 16, 17, 19, 20, 21, 22, 23, 24, 25, 26, 27, 28, 29, 30 with Dr. Ron on 5/12/2021.

## 2021-08-31 ENCOUNTER — NON-APPOINTMENT (OUTPATIENT)
Age: 56
End: 2021-08-31

## 2022-04-15 NOTE — ED PROVIDER NOTE - ENMT NEGATIVE STATEMENT, MLM
Ears: no ear pain and no hearing problems.Nose: no nasal congestion and no nasal drainage.Mouth/Throat: no dysphagia, no hoarseness and no throat pain.Neck: no lumps, no pain, no stiffness and no swollen glands.
8

## 2022-09-13 NOTE — ED ADULT NURSE NOTE - PAIN RATING/NUMBER SCALE (0-10): REST
Depth Of Biopsy: dermis Destruction After The Procedure: No Billing Type: Third-Party Bill Silver Nitrate Text: The wound bed was treated with silver nitrate after the biopsy was performed. Consent was obtained and risks were reviewed including but not limited to scarring, infection, bleeding, scabbing, incomplete removal, nerve damage and allergy to anesthesia. Anesthesia Type: 0.5% lidocaine with 1:200,000 epinephrine and a 1:10 solution of 8.4% sodium bicarbonate Post-Care Instructions: I reviewed with the patient in detail post-care instructions. Patient is to keep the biopsy site dry overnight, wash with soap and water and then apply ointment daily healed. Biopsy Method: double edge Personna blade Cryotherapy Text: The wound bed was treated with cryotherapy after the biopsy was performed. Biopsy Type: H and E Render Post-Care Instructions In Note?: yes Notification Instructions: Patient will be notified of biopsy results. However, patient instructed to call the office if not contacted within 2 weeks. Body Location Override (Optional - Billing Will Still Be Based On Selected Body Map Location If Applicable): Right proximal lateral calf Anesthesia Volume In Cc: 0.5 Additional Anesthesia Volume In Cc (Will Not Render If 0): 0 Hemostasis: Aluminum Chloride Size Of Lesion In Cm: 1.2 Electrodesiccation And Curettage Text: The wound bed was treated with electrodesiccation and curettage after the biopsy was performed. Detail Level: Simple Dressing: pressure dressing with telfa Electrodesiccation Text: The wound bed was treated with electrodesiccation after the biopsy was performed. Information: Selecting Yes will display possible errors in your note based on the variables you have selected. This validation is only offered as a suggestion for you. PLEASE NOTE THAT THE VALIDATION TEXT WILL BE REMOVED WHEN YOU FINALIZE YOUR NOTE. IF YOU WANT TO FAX A PRELIMINARY NOTE YOU WILL NEED TO TOGGLE THIS TO 'NO' IF YOU DO NOT WANT IT IN YOUR FAXED NOTE. Type Of Destruction Used: Curettage Wound Care: Bacitracin Curettage Text: The wound bed was treated with curettage after the biopsy was performed. 0

## 2022-10-30 ENCOUNTER — EMERGENCY (EMERGENCY)
Facility: HOSPITAL | Age: 57
LOS: 0 days | Discharge: HOME | End: 2022-10-30

## 2022-10-30 VITALS
RESPIRATION RATE: 19 BRPM | HEIGHT: 67.5 IN | OXYGEN SATURATION: 98 % | WEIGHT: 184.97 LBS | DIASTOLIC BLOOD PRESSURE: 88 MMHG | HEART RATE: 75 BPM | SYSTOLIC BLOOD PRESSURE: 135 MMHG | TEMPERATURE: 98 F

## 2022-10-30 VITALS
SYSTOLIC BLOOD PRESSURE: 104 MMHG | DIASTOLIC BLOOD PRESSURE: 73 MMHG | RESPIRATION RATE: 16 BRPM | OXYGEN SATURATION: 100 % | HEART RATE: 87 BPM | TEMPERATURE: 99 F

## 2022-10-30 DIAGNOSIS — K08.199 COMPLETE LOSS OF TEETH DUE TO OTHER SPECIFIED CAUSE, UNSPECIFIED CLASS: Chronic | ICD-10-CM

## 2022-10-30 DIAGNOSIS — I35.0 NONRHEUMATIC AORTIC (VALVE) STENOSIS: ICD-10-CM

## 2022-10-30 DIAGNOSIS — M25.511 PAIN IN RIGHT SHOULDER: ICD-10-CM

## 2022-10-30 DIAGNOSIS — Z98.890 OTHER SPECIFIED POSTPROCEDURAL STATES: Chronic | ICD-10-CM

## 2022-10-30 DIAGNOSIS — Z79.82 LONG TERM (CURRENT) USE OF ASPIRIN: ICD-10-CM

## 2022-10-30 DIAGNOSIS — I10 ESSENTIAL (PRIMARY) HYPERTENSION: ICD-10-CM

## 2022-10-30 DIAGNOSIS — E78.5 HYPERLIPIDEMIA, UNSPECIFIED: ICD-10-CM

## 2022-10-30 PROBLEM — D15.1 BENIGN NEOPLASM OF HEART: Chronic | Status: ACTIVE | Noted: 2021-05-12

## 2022-10-30 PROBLEM — R00.1 BRADYCARDIA, UNSPECIFIED: Chronic | Status: ACTIVE | Noted: 2021-05-12

## 2022-10-30 PROBLEM — I35.1 NONRHEUMATIC AORTIC (VALVE) INSUFFICIENCY: Chronic | Status: ACTIVE | Noted: 2021-05-12

## 2022-10-30 PROBLEM — R01.1 CARDIAC MURMUR, UNSPECIFIED: Chronic | Status: ACTIVE | Noted: 2021-05-12

## 2022-10-30 PROCEDURE — 73030 X-RAY EXAM OF SHOULDER: CPT | Mod: 26,RT

## 2022-10-30 PROCEDURE — 99284 EMERGENCY DEPT VISIT MOD MDM: CPT

## 2022-10-30 PROCEDURE — 73060 X-RAY EXAM OF HUMERUS: CPT | Mod: 26,RT

## 2022-10-30 RX ORDER — OXYCODONE AND ACETAMINOPHEN 5; 325 MG/1; MG/1
1 TABLET ORAL ONCE
Refills: 0 | Status: DISCONTINUED | OUTPATIENT
Start: 2022-10-30 | End: 2022-10-30

## 2022-10-30 RX ORDER — LIDOCAINE 4 G/100G
1 CREAM TOPICAL
Qty: 1 | Refills: 0
Start: 2022-10-30

## 2022-10-30 RX ORDER — KETOROLAC TROMETHAMINE 30 MG/ML
30 SYRINGE (ML) INJECTION ONCE
Refills: 0 | Status: DISCONTINUED | OUTPATIENT
Start: 2022-10-30 | End: 2022-10-30

## 2022-10-30 RX ORDER — IBUPROFEN 200 MG
1 TABLET ORAL
Qty: 16 | Refills: 0
Start: 2022-10-30 | End: 2022-11-02

## 2022-10-30 RX ORDER — METHOCARBAMOL 500 MG/1
750 TABLET, FILM COATED ORAL ONCE
Refills: 0 | Status: COMPLETED | OUTPATIENT
Start: 2022-10-30 | End: 2022-10-30

## 2022-10-30 RX ADMIN — OXYCODONE AND ACETAMINOPHEN 1 TABLET(S): 5; 325 TABLET ORAL at 17:20

## 2022-10-30 RX ADMIN — Medication 30 MILLIGRAM(S): at 15:10

## 2022-10-30 RX ADMIN — METHOCARBAMOL 750 MILLIGRAM(S): 500 TABLET, FILM COATED ORAL at 14:13

## 2022-10-30 RX ADMIN — Medication 30 MILLIGRAM(S): at 14:13

## 2022-10-30 NOTE — ED ADULT NURSE NOTE - OBJECTIVE STATEMENT
patient is A&Ox4. Breathing unlabored on RA. Mother at bedside. PMH HTN, HLD, aortic insufficiency, dental caries, mild aortic valve stenosis, loss of teeth due to extraction. Complaining of intermittent right shoulder pain x2 days. complaining of unable to lift arm due to pain. denies any injury or trauma recently. reports she usually sleeps onto the right side. Complaining of occasional radiation down the arm and right hand numbness. Reports the pain gets worse with movement and lifting up. denies fever, chills, tingling, n/v/d, cp, headache, neck pain, headache, sob, difficulty breathing. denies any other symptoms.

## 2022-10-30 NOTE — ED PROVIDER NOTE - WR INTERPRETED BY 2
"  Occupational Therapy Treatment Note     Date: 6/1/2022  Name: Dimas Darden  Clinic Number: [id number]    Therapy Diagnosis:   Encounter Diagnoses   Name Primary?    Decreased range of motion of right wrist Yes    Decreased  strength of right hand     Generalized weakness      Physician: Julia Quintana PA    Physician Orders: OT eval and tx  Medical Diagnosis: S46.211D (ICD-10-CM) - Traumatic rupture of right distal biceps tendon, subsequent encounter  Evaluation Date: 5/2/2022  Insurance Authorization Period Expiration: 4/8/2023  Plan of Care Certification Period: 5/2/2022 to 8/2/2022     Visit # / Visits authorized: 7/12  FOTO: 1/3     Surgical Procedure: REPAIR, TENDON, BICEPS, DISTAL (Right) using Arthrex technique  Date of Surgery: 3/28/2022  Date of Return to MD: 6/7/2022     Precautions:  Standard (pt was 7 weeks post-op on 5/16/2022)    Time In: 1255  Time Out: 1340  Total Billable Time: 45 minutes    Subjective     Pt reports: "I'm still having some tightness and tingling in my forearm but my elbow seems to be doing better. My wrist is the biggest problem though."    he was compliant with home exercise program given on evaluation.  Response to previous treatment: good  Functional change: improved tolerance for ulnar deviation ROM    Pain: 0/10 (none on evaluation)  Location: RUE    Objective   Objective measures updated at progress report unless specified.       CMS Impairment/Limitation/Restriction for FOTO Upper Arm Survey     Therapist reviewed FOTO scores for Dimas Darden on 5/18/2022.   FOTO documents entered into [employer] - see Media section.     Limitation Score: 35% (40% on evaluation on 5/2/2022)           Treatment     Dimas received the following supervised modalities after being cleared for contradictions for 8 minutes:   - MHP to R wrist (during simultaneous US)    Dimas received the following direct contact modalities after being cleared for contraindications for 8 " minutes:  - Ultrasound:  1 Mhz, 100% duty cycle, 1.0 w/cm2 to proximal forearm scar to decrease adhesions and to improve tissue extensibility    Dimas received the following manual therapy techniques for RUE for 15 minutes:   - scar massage to decrease adhesions  - IASTM to dorsal surface of R forearm to increase circulation and tissue extensibility  - FA and wrist mobs  - STM to intrinsics to decrease tightness     Dimas received therapeutic exercises for RUE for 22 minutes including:  - R wrist extrinsic stretches (flexors, extensors, and crossbody) x 30 sec each  - elbow flex/ext AROM with 4# DB, 3 ways, x 20 reps each  - forearm pron/sup AROM with 1# pronator wand x 20 reps  - wrist AROM with 2# DB, 3 ways over bolster, x 20 reps each  - composite  strengthening with Progressive Hand Exerciser (3rd position) x 30 reps  - UD slides on tabletop with pillowcase and 3# weight on dorsal surface of hand x 30 reps   - UD with wrist extension with AROM (FA pronated) over bolster, x 20 reps  - yellow putty pulls with small finger x 15 reps (not today)    Home Exercises and Education Provided     Education provided:   - progress toward goals    Written Home Exercises Provided: Patient instructed to cont prior HEP  Exercises were reviewed and Dimas was able to demonstrate them prior to the end of the session. Dimas demonstrated good understanding of the HEP provided.     See EMR under Patient Instructions for exercises provided during prior visit.        Assessment     Dimas was seen for his 8th tx session on this date. Pt demonstrated a slight improvement with right ulnar deviation active range of motion but he is still unable to extend his right small finger metacarpal joint past neutral. MD was notified and requested a follow-up apt. Apt made for 6/7/2022.    Dimas is progressing towards his goals and there are no updates to goals at this time. Pt prognosis is Good.     Pt will continue to benefit from  skilled outpatient occupational therapy to address the deficits listed in the problem list on initial evaluation provide pt/family education and to maximize pt's level of independence in the home and community environment.     Pt's spiritual, cultural and educational needs considered and pt agreeable to plan of care and goals.    Anticipated barriers to occupational therapy: delayed start to therapy post-operatively    Goals:  Short Term Goals: (4 weeks)  1. Pt will be independent with HEP in 2 visits. - MET 5/9/2022  2. Pt will report decreased occurrences of R elbow intermittent shooting and aching pain. - MET 5/13/2022  3. Pt will increase R forearm sup AROM by 8-10 degrees to enable dressing, grooming activities.  4. Pt will increase R wrist flex/ext AROM by 10 degrees to enable dressing, grooming activities.     Long Term Goals: (8 weeks)  1. Pt will report decreased pain to 1-2/10 with ADLs. - MET 5/18/2022  2. Pt will exhibit 70-80 degrees of R supination AROM to enable independence with self-care and meal preparation.  3. Pt will exhibit 65-75 degrees of R wrist flexion/extension AROM to enable independence with self-care and meal preparation.  4. Pt will exhibit 70-80# of R  strength to allow a firm grasp on cooking utensils, steering wheel, etc.  5. Pt will exhibit a decrease in FOTO limitation score of <40% which would indicate an improvement in quality of life. - MET 5/18/2022  6. Pt will return to PLOF, including lifting and climbing in/out trucks at work.    Plan     Plan of Care Certification: 5/2/2022 to 8/2/2022.      Outpatient Occupational Therapy 2 times weekly for 6-8 weeks to include the following interventions: Electrical Stimulation NMES, Fluidotherapy, Manual Therapy, Moist Heat/ Ice, Neuromuscular Re-ed, Paraffin, Patient Education, Self Care, Therapeutic Activities, Therapeutic Exercise and Ultrasound      ДМИТРИЙ NOBLE OT      Susanne Perkins

## 2022-10-30 NOTE — ED PROVIDER NOTE - NSFOLLOWUPINSTRUCTIONS_ED_ALL_ED_FT
Rest, drink plenty of fluids.  Advance activity as tolerated.  Continue all previously prescribed medications as directed.  Follow up with your primary care physician and ortho in 1 week - bring copies of your results.  Return to the ER for worsening or persistent symptoms, and/or ANY NEW OR CONCERNING SYMPTOMS. If you have issues obtaining follow up, please call: [phone] to obtain a doctor or specialist who takes your insurance in your area.  You may call [phone] to make an appointment with the internal medicine clinic.

## 2022-10-30 NOTE — ED PROVIDER NOTE - CARE PROVIDER_API CALL
Leon Wilcox)  Alvin Da Silva  Physicians  [address] [place] [address], [place] [address]  Phone: [phone]  Fax: [phone]  Follow Up Time: 4-6 Days

## 2022-10-30 NOTE — ED PROVIDER NOTE - CLINICAL SUMMARY MEDICAL DECISION MAKING FREE TEXT BOX
56 y/o female with htn presents with right shoulder pain x 2 weeks. Atraumatic.   Will get xr right shoulder r/o fracture or dislocation. Toradol, robaxin ordered for pain. 58 y/o female with htn presents with right shoulder pain x 2 weeks. Atraumatic.   Will get xr right shoulder r/o fracture or dislocation. Toradol, robaxin ordered for pain.    XR negative for fracture.   Pt reassessed and reports feeling better but still with pain. Percocet ordered.   Pt reassessed and reports feeling better.   Pt stable to be discharged home and follow up with PMD and ortho.   Advised to RICE.   SLing given for comfort.   Rx motrin sent to pharmacy.

## 2022-10-30 NOTE — ED PROVIDER NOTE - PATIENT PORTAL LINK FT
You can access the FollowMyHealth Patient Portal offered by [facility] by registering at the following website: [email or web address]. By joining [employer]’s FollowMyHealth portal, you will also be able to view your health information using other applications (apps) compatible with our system.

## 2022-10-30 NOTE — ED PROVIDER NOTE - OBJECTIVE STATEMENT
58 y/o female with htn presents with right shoulder pain x 2 days. pt reports waking up and unable to lift up her right shoulder due to pain. Pt reports shooting pain that comes and goes and some numbness to the right fingers when there shooting pain is present. Pain is worse with movement. Pt used lidocaine patch with mild relief. Denies fever, chills, chest pain, dyspnea, dizziness, weakness, recent travel.

## 2022-10-30 NOTE — ED ADULT NURSE REASSESSMENT NOTE - NS ED NURSE REASSESS COMMENT FT1
patient is A&Ox4. Breathing unlabored on RA. No acute or respiratory distress noted. denies any pain at this time. awaiting xray results. Fall and safety precautions maintained.

## 2022-10-30 NOTE — ED PROVIDER NOTE - PHYSICAL EXAMINATION
GEN: Awake, alert, interactive, NAD.  HEAD AND NECK: NC/AT. Airway patent. Neck supple.   EYES:  Clear b/l.    ENT: Moist mucus membranes.   CARDIAC: Regular rate, regular rhythm. No evident pedal edema.    RESP/CHEST: Normal respiratory effort with no use of accessory muscles or retractions. Clear throughout on auscultation.  ABD: soft, non-distended, non-tender. No rebound, no guarding.   BACK: No midline spinal TTP. No CVAT.   EXTREMITIES: RUE: (+) pain to palpation of the right anterior shoulder, (+) unable to range the right shoulder due to pain, (-) erythema, (-) edema, (+) FROM of the elbow, wrist and fingers, (+) pulses intact.  Moving all extremities with no apparent deformities.   SKIN: Warm, dry, intact normal color. No rash.   NEURO: AOx3, CN II-XII grossly intact, no focal deficits.   PSYCH: Appropriate mood and affect.

## 2023-08-14 NOTE — ASU PATIENT PROFILE, ADULT - URINARY CATHETER
Past Medical History:   Diagnosis Date   • Allergic rhinitis    • ED (erectile dysfunction)    • GERD (gastroesophageal reflux disease)    • Gout    • Left radial head fracture    • Joseph's neuroma of right foot    • MALVIN (obstructive sleep apnea)     better with weight loss; does not wear CPAP   • PONV (postoperative nausea and vomiting)    • Prediabetes    • SNHL (sensorineural hearing loss)        
Reviewed and accepted note.  Alert and cooperative     -flashes/floater, -headache, -diplopia      Historically great vision   Last exam 10 years ago    Got something in his left eye at 2:00 PM today.     Cataract.  Discussed natural course and future benefits of extraction.    Compound myopic astigmatism cc presbyopia.  Explained accommodation and advised bifocal.    Content with OTC RORX    Lagophthalmus   Exposure keratoconjunctivitis  Focal corneal erosion, left eye    Soothe XP is an over-the-counter artificial tear.  One drop in each eye at bedtime; repeat in the morning and 1- 2 times daily.            
no

## 2023-10-19 NOTE — H&P PST ADULT - PRO TOBACCO QUIT READY
Detail Level: Simple
Initiate Treatment: Tretinoin: Apply a thin layer to neck every third night, can work up to nightly use as tolerated. Apply after moisturizer dries. Use daily sunscreen, recommend mineral spf 30+.
ready to quit

## 2023-12-27 NOTE — H&P PST ADULT - LIVES WITH, PROFILE
Nursing discipline discharge.  Patient pretibial skin tear healing well, now being left open to air per patient's wife.  No ankle edema bilaterally.  Patient spouse reports intermitten right shoulder pain with movement.  No pain today.  Patient strength and balance improving with continued PT treatment.  Blood sugars under good control.  No further nursing needs at this time,.Nursing discharge discussed with patient and spouse and they  agree timing is appropriate.  Family advised to contact Home care office or PCP if they have nursing needs, PT can also always involve nursing as needed.   spouse

## 2024-01-03 ENCOUNTER — EMERGENCY (EMERGENCY)
Facility: HOSPITAL | Age: 59
LOS: 1 days | Discharge: HOME | End: 2024-01-03
Attending: EMERGENCY MEDICINE
Payer: MEDICAID

## 2024-01-03 VITALS
TEMPERATURE: 98 F | OXYGEN SATURATION: 96 % | DIASTOLIC BLOOD PRESSURE: 84 MMHG | RESPIRATION RATE: 18 BRPM | SYSTOLIC BLOOD PRESSURE: 134 MMHG | HEART RATE: 60 BPM

## 2024-01-03 VITALS
SYSTOLIC BLOOD PRESSURE: 119 MMHG | HEIGHT: 67 IN | WEIGHT: 179.9 LBS | RESPIRATION RATE: 15 BRPM | TEMPERATURE: 98 F | OXYGEN SATURATION: 98 % | HEART RATE: 69 BPM | DIASTOLIC BLOOD PRESSURE: 85 MMHG

## 2024-01-03 DIAGNOSIS — K08.199 COMPLETE LOSS OF TEETH DUE TO OTHER SPECIFIED CAUSE, UNSPECIFIED CLASS: Chronic | ICD-10-CM

## 2024-01-03 DIAGNOSIS — Z98.890 OTHER SPECIFIED POSTPROCEDURAL STATES: Chronic | ICD-10-CM

## 2024-01-03 LAB
ALBUMIN SERPL ELPH-MCNC: 4.8 G/DL — SIGNIFICANT CHANGE UP (ref 3.3–5)
ALBUMIN SERPL ELPH-MCNC: 4.8 G/DL — SIGNIFICANT CHANGE UP (ref 3.3–5)
ALP SERPL-CCNC: 88 U/L — SIGNIFICANT CHANGE UP (ref 40–120)
ALP SERPL-CCNC: 88 U/L — SIGNIFICANT CHANGE UP (ref 40–120)
ALT FLD-CCNC: 23 U/L — SIGNIFICANT CHANGE UP (ref 10–45)
ALT FLD-CCNC: 23 U/L — SIGNIFICANT CHANGE UP (ref 10–45)
ANION GAP SERPL CALC-SCNC: 13 MMOL/L — SIGNIFICANT CHANGE UP (ref 5–17)
ANION GAP SERPL CALC-SCNC: 13 MMOL/L — SIGNIFICANT CHANGE UP (ref 5–17)
APPEARANCE UR: ABNORMAL
APPEARANCE UR: ABNORMAL
AST SERPL-CCNC: 25 U/L — SIGNIFICANT CHANGE UP (ref 10–40)
AST SERPL-CCNC: 25 U/L — SIGNIFICANT CHANGE UP (ref 10–40)
BACTERIA # UR AUTO: ABNORMAL /HPF
BACTERIA # UR AUTO: ABNORMAL /HPF
BASOPHILS # BLD AUTO: 0.05 K/UL — SIGNIFICANT CHANGE UP (ref 0–0.2)
BASOPHILS # BLD AUTO: 0.05 K/UL — SIGNIFICANT CHANGE UP (ref 0–0.2)
BASOPHILS NFR BLD AUTO: 0.8 % — SIGNIFICANT CHANGE UP (ref 0–2)
BASOPHILS NFR BLD AUTO: 0.8 % — SIGNIFICANT CHANGE UP (ref 0–2)
BILIRUB SERPL-MCNC: 0.4 MG/DL — SIGNIFICANT CHANGE UP (ref 0.2–1.2)
BILIRUB SERPL-MCNC: 0.4 MG/DL — SIGNIFICANT CHANGE UP (ref 0.2–1.2)
BILIRUB UR-MCNC: NEGATIVE — SIGNIFICANT CHANGE UP
BILIRUB UR-MCNC: NEGATIVE — SIGNIFICANT CHANGE UP
BUN SERPL-MCNC: 16 MG/DL — SIGNIFICANT CHANGE UP (ref 7–23)
BUN SERPL-MCNC: 16 MG/DL — SIGNIFICANT CHANGE UP (ref 7–23)
CALCIUM SERPL-MCNC: 10.5 MG/DL — SIGNIFICANT CHANGE UP (ref 8.4–10.5)
CALCIUM SERPL-MCNC: 10.5 MG/DL — SIGNIFICANT CHANGE UP (ref 8.4–10.5)
CAST: 0 /LPF — SIGNIFICANT CHANGE UP (ref 0–4)
CAST: 0 /LPF — SIGNIFICANT CHANGE UP (ref 0–4)
CHLORIDE SERPL-SCNC: 103 MMOL/L — SIGNIFICANT CHANGE UP (ref 96–108)
CHLORIDE SERPL-SCNC: 103 MMOL/L — SIGNIFICANT CHANGE UP (ref 96–108)
CO2 SERPL-SCNC: 26 MMOL/L — SIGNIFICANT CHANGE UP (ref 22–31)
CO2 SERPL-SCNC: 26 MMOL/L — SIGNIFICANT CHANGE UP (ref 22–31)
COLOR SPEC: YELLOW — SIGNIFICANT CHANGE UP
COLOR SPEC: YELLOW — SIGNIFICANT CHANGE UP
CREAT SERPL-MCNC: 0.98 MG/DL — SIGNIFICANT CHANGE UP (ref 0.5–1.3)
CREAT SERPL-MCNC: 0.98 MG/DL — SIGNIFICANT CHANGE UP (ref 0.5–1.3)
DIFF PNL FLD: ABNORMAL
DIFF PNL FLD: ABNORMAL
EGFR: 67 ML/MIN/1.73M2 — SIGNIFICANT CHANGE UP
EGFR: 67 ML/MIN/1.73M2 — SIGNIFICANT CHANGE UP
EOSINOPHIL # BLD AUTO: 0.37 K/UL — SIGNIFICANT CHANGE UP (ref 0–0.5)
EOSINOPHIL # BLD AUTO: 0.37 K/UL — SIGNIFICANT CHANGE UP (ref 0–0.5)
EOSINOPHIL NFR BLD AUTO: 5.7 % — SIGNIFICANT CHANGE UP (ref 0–6)
EOSINOPHIL NFR BLD AUTO: 5.7 % — SIGNIFICANT CHANGE UP (ref 0–6)
GLUCOSE SERPL-MCNC: 94 MG/DL — SIGNIFICANT CHANGE UP (ref 70–99)
GLUCOSE SERPL-MCNC: 94 MG/DL — SIGNIFICANT CHANGE UP (ref 70–99)
GLUCOSE UR QL: NEGATIVE MG/DL — SIGNIFICANT CHANGE UP
GLUCOSE UR QL: NEGATIVE MG/DL — SIGNIFICANT CHANGE UP
HCT VFR BLD CALC: 46.7 % — HIGH (ref 34.5–45)
HCT VFR BLD CALC: 46.7 % — HIGH (ref 34.5–45)
HGB BLD-MCNC: 15.9 G/DL — HIGH (ref 11.5–15.5)
HGB BLD-MCNC: 15.9 G/DL — HIGH (ref 11.5–15.5)
IMM GRANULOCYTES NFR BLD AUTO: 0.6 % — SIGNIFICANT CHANGE UP (ref 0–0.9)
IMM GRANULOCYTES NFR BLD AUTO: 0.6 % — SIGNIFICANT CHANGE UP (ref 0–0.9)
KETONES UR-MCNC: ABNORMAL MG/DL
KETONES UR-MCNC: ABNORMAL MG/DL
LEUKOCYTE ESTERASE UR-ACNC: ABNORMAL
LEUKOCYTE ESTERASE UR-ACNC: ABNORMAL
LYMPHOCYTES # BLD AUTO: 2.92 K/UL — SIGNIFICANT CHANGE UP (ref 1–3.3)
LYMPHOCYTES # BLD AUTO: 2.92 K/UL — SIGNIFICANT CHANGE UP (ref 1–3.3)
LYMPHOCYTES # BLD AUTO: 45.3 % — HIGH (ref 13–44)
LYMPHOCYTES # BLD AUTO: 45.3 % — HIGH (ref 13–44)
MCHC RBC-ENTMCNC: 31 PG — SIGNIFICANT CHANGE UP (ref 27–34)
MCHC RBC-ENTMCNC: 31 PG — SIGNIFICANT CHANGE UP (ref 27–34)
MCHC RBC-ENTMCNC: 34 GM/DL — SIGNIFICANT CHANGE UP (ref 32–36)
MCHC RBC-ENTMCNC: 34 GM/DL — SIGNIFICANT CHANGE UP (ref 32–36)
MCV RBC AUTO: 91 FL — SIGNIFICANT CHANGE UP (ref 80–100)
MCV RBC AUTO: 91 FL — SIGNIFICANT CHANGE UP (ref 80–100)
MONOCYTES # BLD AUTO: 0.38 K/UL — SIGNIFICANT CHANGE UP (ref 0–0.9)
MONOCYTES # BLD AUTO: 0.38 K/UL — SIGNIFICANT CHANGE UP (ref 0–0.9)
MONOCYTES NFR BLD AUTO: 5.9 % — SIGNIFICANT CHANGE UP (ref 2–14)
MONOCYTES NFR BLD AUTO: 5.9 % — SIGNIFICANT CHANGE UP (ref 2–14)
NEUTROPHILS # BLD AUTO: 2.69 K/UL — SIGNIFICANT CHANGE UP (ref 1.8–7.4)
NEUTROPHILS # BLD AUTO: 2.69 K/UL — SIGNIFICANT CHANGE UP (ref 1.8–7.4)
NEUTROPHILS NFR BLD AUTO: 41.7 % — LOW (ref 43–77)
NEUTROPHILS NFR BLD AUTO: 41.7 % — LOW (ref 43–77)
NITRITE UR-MCNC: NEGATIVE — SIGNIFICANT CHANGE UP
NITRITE UR-MCNC: NEGATIVE — SIGNIFICANT CHANGE UP
NRBC # BLD: 0 /100 WBCS — SIGNIFICANT CHANGE UP (ref 0–0)
NRBC # BLD: 0 /100 WBCS — SIGNIFICANT CHANGE UP (ref 0–0)
PH UR: 5.5 — SIGNIFICANT CHANGE UP (ref 5–8)
PH UR: 5.5 — SIGNIFICANT CHANGE UP (ref 5–8)
PLATELET # BLD AUTO: 210 K/UL — SIGNIFICANT CHANGE UP (ref 150–400)
PLATELET # BLD AUTO: 210 K/UL — SIGNIFICANT CHANGE UP (ref 150–400)
POTASSIUM SERPL-MCNC: 4.6 MMOL/L — SIGNIFICANT CHANGE UP (ref 3.5–5.3)
POTASSIUM SERPL-MCNC: 4.6 MMOL/L — SIGNIFICANT CHANGE UP (ref 3.5–5.3)
POTASSIUM SERPL-SCNC: 4.6 MMOL/L — SIGNIFICANT CHANGE UP (ref 3.5–5.3)
POTASSIUM SERPL-SCNC: 4.6 MMOL/L — SIGNIFICANT CHANGE UP (ref 3.5–5.3)
PROT SERPL-MCNC: 7.8 G/DL — SIGNIFICANT CHANGE UP (ref 6–8.3)
PROT SERPL-MCNC: 7.8 G/DL — SIGNIFICANT CHANGE UP (ref 6–8.3)
PROT UR-MCNC: NEGATIVE MG/DL — SIGNIFICANT CHANGE UP
PROT UR-MCNC: NEGATIVE MG/DL — SIGNIFICANT CHANGE UP
RBC # BLD: 5.13 M/UL — SIGNIFICANT CHANGE UP (ref 3.8–5.2)
RBC # BLD: 5.13 M/UL — SIGNIFICANT CHANGE UP (ref 3.8–5.2)
RBC # FLD: 12.4 % — SIGNIFICANT CHANGE UP (ref 10.3–14.5)
RBC # FLD: 12.4 % — SIGNIFICANT CHANGE UP (ref 10.3–14.5)
RBC CASTS # UR COMP ASSIST: 3 /HPF — SIGNIFICANT CHANGE UP (ref 0–4)
RBC CASTS # UR COMP ASSIST: 3 /HPF — SIGNIFICANT CHANGE UP (ref 0–4)
REVIEW: SIGNIFICANT CHANGE UP
REVIEW: SIGNIFICANT CHANGE UP
SODIUM SERPL-SCNC: 142 MMOL/L — SIGNIFICANT CHANGE UP (ref 135–145)
SODIUM SERPL-SCNC: 142 MMOL/L — SIGNIFICANT CHANGE UP (ref 135–145)
SP GR SPEC: 1.02 — SIGNIFICANT CHANGE UP (ref 1–1.03)
SP GR SPEC: 1.02 — SIGNIFICANT CHANGE UP (ref 1–1.03)
SQUAMOUS # UR AUTO: 10 /HPF — HIGH (ref 0–5)
SQUAMOUS # UR AUTO: 10 /HPF — HIGH (ref 0–5)
UROBILINOGEN FLD QL: 1 MG/DL — SIGNIFICANT CHANGE UP (ref 0.2–1)
UROBILINOGEN FLD QL: 1 MG/DL — SIGNIFICANT CHANGE UP (ref 0.2–1)
WBC # BLD: 6.45 K/UL — SIGNIFICANT CHANGE UP (ref 3.8–10.5)
WBC # BLD: 6.45 K/UL — SIGNIFICANT CHANGE UP (ref 3.8–10.5)
WBC # FLD AUTO: 6.45 K/UL — SIGNIFICANT CHANGE UP (ref 3.8–10.5)
WBC # FLD AUTO: 6.45 K/UL — SIGNIFICANT CHANGE UP (ref 3.8–10.5)
WBC UR QL: 29 /HPF — HIGH (ref 0–5)
WBC UR QL: 29 /HPF — HIGH (ref 0–5)

## 2024-01-03 PROCEDURE — 74176 CT ABD & PELVIS W/O CONTRAST: CPT | Mod: 26,MA

## 2024-01-03 PROCEDURE — 96374 THER/PROPH/DIAG INJ IV PUSH: CPT

## 2024-01-03 PROCEDURE — 87086 URINE CULTURE/COLONY COUNT: CPT

## 2024-01-03 PROCEDURE — 99284 EMERGENCY DEPT VISIT MOD MDM: CPT | Mod: 25

## 2024-01-03 PROCEDURE — 85025 COMPLETE CBC W/AUTO DIFF WBC: CPT

## 2024-01-03 PROCEDURE — 74176 CT ABD & PELVIS W/O CONTRAST: CPT | Mod: MA

## 2024-01-03 PROCEDURE — 99284 EMERGENCY DEPT VISIT MOD MDM: CPT

## 2024-01-03 PROCEDURE — 80053 COMPREHEN METABOLIC PANEL: CPT

## 2024-01-03 PROCEDURE — 36415 COLL VENOUS BLD VENIPUNCTURE: CPT

## 2024-01-03 PROCEDURE — 81001 URINALYSIS AUTO W/SCOPE: CPT

## 2024-01-03 RX ORDER — CYCLOBENZAPRINE HYDROCHLORIDE 10 MG/1
1 TABLET, FILM COATED ORAL
Qty: 21 | Refills: 0
Start: 2024-01-03 | End: 2024-01-09

## 2024-01-03 RX ORDER — KETOROLAC TROMETHAMINE 30 MG/ML
15 SYRINGE (ML) INJECTION ONCE
Refills: 0 | Status: DISCONTINUED | OUTPATIENT
Start: 2024-01-03 | End: 2024-01-03

## 2024-01-03 RX ORDER — CYCLOBENZAPRINE HYDROCHLORIDE 10 MG/1
10 TABLET, FILM COATED ORAL ONCE
Refills: 0 | Status: COMPLETED | OUTPATIENT
Start: 2024-01-03 | End: 2024-01-03

## 2024-01-03 RX ORDER — ACETAMINOPHEN 500 MG
650 TABLET ORAL ONCE
Refills: 0 | Status: COMPLETED | OUTPATIENT
Start: 2024-01-03 | End: 2024-01-03

## 2024-01-03 RX ORDER — LIDOCAINE 4 G/100G
1 CREAM TOPICAL
Qty: 1 | Refills: 0
Start: 2024-01-03 | End: 2024-01-07

## 2024-01-03 RX ORDER — SODIUM CHLORIDE 9 MG/ML
1000 INJECTION INTRAMUSCULAR; INTRAVENOUS; SUBCUTANEOUS ONCE
Refills: 0 | Status: COMPLETED | OUTPATIENT
Start: 2024-01-03 | End: 2024-01-03

## 2024-01-03 RX ORDER — LIDOCAINE 4 G/100G
1 CREAM TOPICAL ONCE
Refills: 0 | Status: COMPLETED | OUTPATIENT
Start: 2024-01-03 | End: 2024-01-03

## 2024-01-03 RX ADMIN — SODIUM CHLORIDE 1000 MILLILITER(S): 9 INJECTION INTRAMUSCULAR; INTRAVENOUS; SUBCUTANEOUS at 18:08

## 2024-01-03 RX ADMIN — Medication 15 MILLIGRAM(S): at 21:32

## 2024-01-03 RX ADMIN — LIDOCAINE 1 PATCH: 4 CREAM TOPICAL at 16:17

## 2024-01-03 RX ADMIN — CYCLOBENZAPRINE HYDROCHLORIDE 10 MILLIGRAM(S): 10 TABLET, FILM COATED ORAL at 16:24

## 2024-01-03 RX ADMIN — Medication 650 MILLIGRAM(S): at 16:18

## 2024-01-03 NOTE — ED PROVIDER NOTE - CLINICAL SUMMARY MEDICAL DECISION MAKING FREE TEXT BOX
Patient presents to ED for left sided back pain. Differential includes quadratus lumborum strain vs acute cystitis. Given neg straight leg test, no radicular symptoms, lower suspicion for this pain being due to lumbar disc herniation. Will symptomatically support with flexeril 10mg and acetaminophen 650mg as patient already took ASA prior to arrival.     Will obtain UA to rule out acute cystitis.

## 2024-01-03 NOTE — ED ADULT NURSE NOTE - NSFALLUNIVINTERV_ED_ALL_ED
Bed/Stretcher in lowest position, wheels locked, appropriate side rails in place/Call bell, personal items and telephone in reach/Instruct patient to call for assistance before getting out of bed/chair/stretcher/Non-slip footwear applied when patient is off stretcher/[place] to call system/Physically safe environment - no spills, clutter or unnecessary equipment/Purposeful proactive rounding/Room/bathroom lighting operational, light cord in reach Bed/Stretcher in lowest position, wheels locked, appropriate side rails in place/Call bell, personal items and telephone in reach/Instruct patient to call for assistance before getting out of bed/chair/stretcher/Non-slip footwear applied when patient is off stretcher/[place] to call system/Physically safe environment - no spills, clutter or unnecessary equipment/Purposeful proactive rounding/Room/bathroom lighting operational, light cord in reach

## 2024-01-03 NOTE — ED PROVIDER NOTE - PROGRESS NOTE DETAILS
Patient reassessed, informed that she has moderate blood in her UA. Will obtain BMP to evaluate for kidney function, and CT Abd/Pelvis to rule out nephrolithiasis or renal colic for her pain. Patient's pain is improved, now able to conduct forward flexion and reach to toes, better range of motion compared to arrival. CT reviewed, found to have ill defined mass of bilateral kidneys, renal cyst on differential. Counseled patient to follow up with PMD and to continue monitoring kidney function. At this time, left flank pain likely due to quadratus lumborum vs other muscular strain as opposed to kidney stone. Will discharge patient with muscle relaxants and lidocaine pain with return precautions including hematuria, fever, chills, or other life-threatening symptoms

## 2024-01-03 NOTE — ED PROVIDER NOTE - ATTENDING CONTRIBUTION TO CARE
Juan Rueda MD:  I personally made/approve the management plan and take responsibility for the patient management.    MDM: 58-year-old male with history of hypertension who presents with left-sided back pain which has been atraumatic, described as tightness.  No radiation down the lower extremity.  Symptoms worsened with movement.  Patient has been taking BC powder and heat packs for pain with mild relief.  No spine surgery or procedures in the past.    ROS: denies fevers, chills, weight loss, pain worse at night, urinary retention, incontinence of bowel or bladder, saddle anesthesia, lower extremity weakness or numbness, and no recent trauma or falls.    On examination, patient with stable vitals, well-appearing, nontoxic. Cardiac examination RRR, lungs CTAB, abdomen soft and nontender with no distention, no peritoneal signs including guarding/rigidity/rebound, no CVA tenderness, neurovascularly intact in all 4 extremities.  L-SPINE: There is no erythema, deformity, swelling. Palpation without midline spinal tenderness or step off.  (+) Mild left paraspinal lumbar tenderness.  L2-S2 with normal 5/5 motor strength and normal sensation.  No hyperreflexia, clonus.    Patient with atraumatic back pain with no red flags or neuro deficits. Differential includes but is not limited to lumbar radiculopathy, spondylosis, disc herniation, degenerative disc disease, or muscular strain/ligamentous sprain.  However, not consistent with presentation for epidural hematoma, or infectious etiology including epidural abscess, osteomyelitis/discitis, and no evidence of cauda equina or cord compression.  Will obtain urinalysis to evaluate for  pathology.  Pain control and reassess.    Signed out at 1600 pending labs and close reassessments for further treatment and disposition decisions.

## 2024-01-03 NOTE — ED PROVIDER NOTE - PATIENT PORTAL LINK FT
You can access the FollowMyHealth Patient Portal offered by [facility] by registering at the following website: [email or web address]. By joining [facility]’s FollowMyHealth portal, you will also be able to view your health information using other applications (apps) compatible with our system. You can access the FollowMyHealth Patient Portal offered by [facility] by registering at the following website: [email or web address]. By joining [facility]’s FollowMyHealth portal, you will also be able to view your health information using other applications (apps) compatible with our system.

## 2024-01-03 NOTE — ED PROVIDER NOTE - PHYSICAL EXAMINATION
GEN: Pt in NAD, A&O x3. GCS 15  EYES: Sclera white w/o injection, PERRLA, EOMI.  ENT: Head NCAT. Nose without deformity, turbinates without edema or erythema, no DC. No auricular TTP. Mouth and pharynx without erythema or exudates, uvula midline, no tonsillar enlargement. Neck supple FROM.   RESP: No chest wall tenderness, CTA b/l, no wheezes, rales, or rhonchi.   CARDIAC: RRR, clear distinct S1, S2, no murmurs, gallops, or rubs.   ABD: Abdomen non-distended, soft, non-tender, no rebound or guarding. No CVAT b/l.  VASC: 2+ carotid, radial, and dorsalis pedis pulses b/l. No edema or tenderness of the lower extremities.  NEURO: CN 2-12 grossly intact. Normal and equal sensation UE, LE and face b/l. 5/5 strength UE and LE b/l.  Pronator drift negative. Normal gait and gross cerebellar functioning.  MSK: Neg straight leg test. No joint erythema or obvious deformities. Spine without obvious deformity. No midline or paraspinal tenderness. FROM w/o pain of upper and lower extremities b/l. Trunk rotation limited due to pain.  SKIN: No rashes noted.

## 2024-01-03 NOTE — ED ADULT TRIAGE NOTE - PAIN RATING/NUMBER SCALE (0-10): ACTIVITY
Impression: Primary open-angle glaucoma, right eye, moderate stage: H40.1112. Tmax 40/45, /579, 3/20 OCT 66/58 7/6, 3/20 HVF OD Likely full (poor reliability) OS GD, s/ p SLT OU 4/18 Plan: Discussed diagnosis with patient. HVF and RNFL OCT ordered and reviewed with patient. IOP reasonable. Recommend patient continue Combigan BID OU. Will continue to monitor. 4 month IOP check Dr Jeramy Meyers. 4 (moderate pain)

## 2024-01-03 NOTE — ED PROVIDER NOTE - NSFOLLOWUPINSTRUCTIONS_ED_ALL_ED_FT
You were seen in ER today for left sided back pain, you likely suffered from a muscle spasm. Your electrolytes and kidney function were normal. There is no urinary tract infection. Your CT abdomen pelvis showed ill defined masses that resembled renal cysts, please follow up with your primary care doctor to ensure interval monitoring of your kidneys

## 2024-01-03 NOTE — ED ADULT NURSE NOTE - OBJECTIVE STATEMENT
The patient is a 58y female presenting to the ED from home for the complaints of back pain. She reports a PMH of HTN only. She states on Saturday she was at home and presenting with a sudden onset of muscle spasm to her left lower back. Patient reports since Saturday shes been having this constant pain and discomfort to the same area, prompting ED visit. Upon assessment she is breathing spontaneously and unlabored on room air. She is alert and orientated x4 and responds appropriately in complete sentences. Pt denies chest pain, palpitations, shortness of breath, headache, visual disturbances, numbness/tingling, fever, chills, diaphoresis,  nausea, vomiting, constipation, diarrhea, or urinary symptoms. Safety and comfort measures provided, bed locked and in lowest position, side rails up for safety. Awaiting MD Reassessment.

## 2024-01-03 NOTE — ED PROVIDER NOTE - OBJECTIVE STATEMENT
57 yo F PMH HTN presents to ED for 5 days of atraumatic left sided back pain. Patient described pain as a tight, muscle like sensation. Denied dysuria, fever, chills. Reports has been using BC powder (aspirin) and heat pack for pain with some relief.

## 2024-01-04 LAB
CULTURE RESULTS: SIGNIFICANT CHANGE UP
CULTURE RESULTS: SIGNIFICANT CHANGE UP
SPECIMEN SOURCE: SIGNIFICANT CHANGE UP
SPECIMEN SOURCE: SIGNIFICANT CHANGE UP

## 2024-03-05 NOTE — ED ADULT TRIAGE NOTE - AS TEMP SITE
What Type Of Note Output Would You Prefer (Optional)?: Standard Output Hpi Title: Evaluation of a Skin Lesion oral